# Patient Record
(demographics unavailable — no encounter records)

---

## 2024-10-17 NOTE — DATA REVIEWED
[MRI] : MRI [Bilateral] : of the bilateral [Knee] : knee [Report was reviewed and noted in the chart] : The report was reviewed and noted in the chart [I independently reviewed and interpreted images and report] : I independently reviewed and interpreted images and report [I reviewed the films/CD] : I reviewed the films/CD [FreeTextEntry1] : [facility] 10/11/24:  LEFT: 1. Mild ACL sprain with surrounding synovitis without discontinuity 2. Slight patellofemoral effusion, synovitis, medial synovial plica, and small popliteal cyst 3. No acute osseous injury, meniscal tear, full-thickness chondral defect, or loose body 4. No significant interval change from prior exam  RIGHT: 1. Mild partial tearing and delamination of the medial quadriceps tendon insertion with mild surrounding bursitis without discontinuity. Clinical correlation is recommended 2. Slight patellofemoral effusion, synovitis, and slight popliteal cyst 3. Mild ACL sprain most likely chronic 4. No acute osseous injury, meniscal tear, full-thickness chondral deftc, or loose body

## 2024-10-17 NOTE — IMAGING
[de-identified] : BILATERAL KNEE Inspection: mild effusion, left knee with abrasion to anterior aspect, no sign of infection Palpation: medial joint line tenderness  Knee Range of Motion: 0-130  Strength: 5/5 Quadriceps strength, 5/5 Hamstring strength Neurological: light touch is intact throughout Ligament Stability and Special Tests:  McMurrays: Positive Lachman: neg Pivot Shift: neg Posterior Drawer: neg Valgus: neg Varus: neg Patella Apprehension: neg Patella Maltracking: neg

## 2024-10-17 NOTE — HISTORY OF PRESENT ILLNESS
[de-identified] : 10/17/2024: Patient present today for follow-up of B/L knees. Underwent MRI and is here to discuss the imaging results.   10/4/24: Here for new injury to the left knee about 5 weeks ago and had fallen. Had a laceration to the knee which has been healing and then increased pain to the left posterior area of the knee. Right knee has been an ongoing issue for years. PF pain when seated for a while and when getting up

## 2024-10-17 NOTE — DISCUSSION/SUMMARY
[de-identified] : Assessment: The patient is a 47 year old male with physical exam findings consistent with PFS, PARTIAL QUAD TEAR, QUAD TENDINITIS, ACL SPRAIN.   Patient and I discussed their symptoms. Discussed findings of today's exam and possible causes of patient's pain. Educated patient on their most probable diagnosis. Reviewed possible courses of treatment, and we collaboratively decided best course of treatment at this time will include:   1. Reviewed and discussed MRI results with patient. Discussed probable dx. Discussed nonoperative vs operative treatment options.  2. Discussed possible PRP if necessary 3. Initiate PT, script provided     Follow up in 6-8 weeks  Instructions: Dx / Natural History The patient was advised of the diagnosis.  The natural history of the pathology was explained in full to the patient in layman's terms.  Several different treatment options were discussed and explained in full to the patient including the risks and benefits of both surgical and non-surgical treatments.  All questions and concerns were answered.   RICE I explained to the patient that rest, ice, compression, and elevation would benefit them.  They may return to activity after follow-up or when they no longer have any pain.   NSAIDs - OTC Patient is to begin over the counter oral anti-inflammatory medications on an as needed basis, as long as there are no medical contraindications.  Patient is counseled on possible GI and blood pressure side effects.   Pain Guide Activities The patient was advised to let pain guide the gradual advancement of activities.   Icing The patient was advised to apply ice (wrapped in a towel or protective covering) to the area daily (20 minutes at a time, 2-4X/day).   All of the patient's questions were answered to His satisfaction. Diagnoses and potential treatments were reviewed. He agreed with the plan and would like to move forward with it.

## 2024-12-05 NOTE — HISTORY OF PRESENT ILLNESS
[de-identified] : 12/05/2024: JUANA SHAH is a 47 year old male who presents for evaluation of Left shoulder. Patient states that over the summer, he noticed a sharp pain in shoulder, without specific injury. He states pain has been persistent since then. States pain is induced with certain stretching/movements. he has not done PT for shoulder. Of note, pt had hx of right frozen shoulder years ago. Pt sees Dr. Randolph for bilateral knees, goes to PT for knees. DM Last A1C 6.5.

## 2024-12-05 NOTE — DATA REVIEWED
[FreeTextEntry1] :  Left X-Ray Examination of the SHOULDER (2 views):  no fractures, subluxations or dislocations.   X-Ray Examination of the SCAPULA 1 or 2 views shows: no significant abnormalities    14

## 2024-12-05 NOTE — IMAGING
[de-identified] :  LEFT SHOULDER  Inspection: No swelling.   Palpation : Tenderness is noted at the anterior shoulder and lateral shoulder.  Range of motion: There is pain with range of motion. There is decreased range of motion with pain. Active motion equals passive motion.   , ER 40  Strength: There is pain, weakness, and discomfort with strength testing.  Neurological testings: motor and sensor intact distally.  Ligament Stability and Special Testss:   There is positive arc of pain.   Shoulder apprehension: neg  Shoulder relocation: neg  Obriens test: pos  Biceps Active test: neg  Zamora Labral Shear: neg  Impingement testing: pos  Ravin testing: pos  Whipple: pos  Cross Body Adduction: neg

## 2024-12-05 NOTE — DISCUSSION/SUMMARY
[Medication Risks Reviewed] : Medication risks reviewed [de-identified] :  - We discussed their diagnosis and treatment options at length including the risks and benefits of both surgical and non-surgical options..   -The pertinent aspects of the natural history of adhesive capsulitis were reviewed with the patient, including the three broad stages of freezing, frozen, and thawing. I reviewed with the patient that this condition is often associated with diabetes mellitus and thyroid disorders. The time from disease onset until symptom resolution may be one to two years.  - We will continue conservative treatment with PT, icing, and anti-inflammatory medication.  - Physical Therapy will be prescribed to work on ROM along with a home exercise program.  - The risks, benefits, and alternatives to intra-articular corticosteroid injection were reviewed with the patient. The patient wished to proceed with this treatment course.  - If in 3 months no improvement, will consider a second injection.  - If at 9 months no improvement, will consider a capsular release.

## 2024-12-13 NOTE — IMAGING
[de-identified] : BILATERAL KNEE Inspection: mild effusion, left knee with abrasion to anterior aspect, no sign of infection Palpation: medial joint line tenderness  Knee Range of Motion: 0-130  Strength: 5/5 Quadriceps strength, 5/5 Hamstring strength Neurological: light touch is intact throughout Ligament Stability and Special Tests:  McMurrays: Positive Lachman: neg Pivot Shift: neg Posterior Drawer: neg Valgus: neg Varus: neg Patella Apprehension: neg Patella Maltracking: neg

## 2024-12-13 NOTE — DISCUSSION/SUMMARY
[de-identified] : Assessment: The patient is a 47 year old male with physical exam findings consistent with PFS, PARTIAL QUAD TEAR, QUAD TENDINITIS, ACL SPRAIN.   Patient and I discussed their symptoms. Discussed findings of today's exam and possible causes of patient's pain. Educated patient on their most probable diagnosis. Reviewed possible courses of treatment, and we collaboratively decided best course of treatment at this time will include:   1. Initiate PT, script provided 2. HEP   Follow up PRN  Instructions: Dx / Natural History The patient was advised of the diagnosis.  The natural history of the pathology was explained in full to the patient in layman's terms.  Several different treatment options were discussed and explained in full to the patient including the risks and benefits of both surgical and non-surgical treatments.  All questions and concerns were answered.   RICE I explained to the patient that rest, ice, compression, and elevation would benefit them.  They may return to activity after follow-up or when they no longer have any pain.   NSAIDs - OTC Patient is to begin over the counter oral anti-inflammatory medications on an as needed basis, as long as there are no medical contraindications.  Patient is counseled on possible GI and blood pressure side effects.   Pain Guide Activities The patient was advised to let pain guide the gradual advancement of activities.   Icing The patient was advised to apply ice (wrapped in a towel or protective covering) to the area daily (20 minutes at a time, 2-4X/day).   All of the patient's questions were answered to His satisfaction. Diagnoses and potential treatments were reviewed. He agreed with the plan and would like to move forward with it.

## 2024-12-13 NOTE — HISTORY OF PRESENT ILLNESS
[de-identified] : 12/13/24: Bilateral knee follow up. Left knee is doing well. Still some aching in the right knee, still some discomfort with stairs but no affect to ADL  10/17/2024: Patient present today for follow-up of B/L knees. Underwent MRI and is here to discuss the imaging results.   10/4/24: Here for new injury to the left knee about 5 weeks ago and had fallen. Had a laceration to the knee which has been healing and then increased pain to the left posterior area of the knee. Right knee has been an ongoing issue for years. PF pain when seated for a while and when getting up

## 2025-02-27 NOTE — DATA REVIEWED
[FreeTextEntry1] :  Left X-Ray Examination of the SHOULDER (2 views):  no fractures, subluxations or dislocations.   X-Ray Examination of the SCAPULA 1 or 2 views shows: no significant abnormalities

## 2025-02-27 NOTE — IMAGING
[de-identified] :  LEFT SHOULDER  Inspection: No swelling.   Palpation : Tenderness is noted at the anterior shoulder and lateral shoulder.  Range of motion: There is pain with range of motion. There is decreased range of motion with pain. Active motion equals passive motion.   , ER 70 IR L hip Strength: There is pain, weakness, and discomfort with strength testing.  Neurological testing: motor and sensor intact distally.  Ligament Stability and Special Tests:   There is positive arc of pain.   Shoulder apprehension: neg  Shoulder relocation: neg  Obriens test: pos  Biceps Active test: neg  Zamora Labral Shear: neg  Impingement testing: pos  Ravin testing: pos  Whipple: pos  Cross Body Adduction: neg

## 2025-02-27 NOTE — HISTORY OF PRESENT ILLNESS
[de-identified] : 12/05/2024: JUANA SHAH is a 47 year old male who presents for evaluation of Left shoulder. Patient states that over the summer, he noticed a sharp pain in shoulder, without specific injury. He states pain has been persistent since then. States pain is induced with certain stretching/movements. he has not done PT for shoulder. Of note, pt had hx of right frozen shoulder years ago. Pt sees Dr. Randolph for bilateral knees, goes to PT for knees. DM Last A1C 6.5.   02/27/2025 JUANA  is here today to follow up on left shoulder. Patient had been doing PT and taking meloxicam. Patient reports some slightly improvement since last visit.

## 2025-02-27 NOTE — DISCUSSION/SUMMARY
[Medication Risks Reviewed] : Medication risks reviewed [de-identified] :  - We discussed their diagnosis and treatment options at length including the risks and benefits of both surgical and non-surgical options..   -The pertinent aspects of the natural history of adhesive capsulitis were reviewed with the patient, including the three broad stages of freezing, frozen, and thawing. I reviewed with the patient that this condition is often associated with diabetes mellitus and thyroid disorders. The time from disease onset until symptom resolution may be one to two years.  - We will continue conservative treatment with PT, icing, and anti-inflammatory medication.  - Physical Therapy will be prescribed to work on ROM along with a home exercise program.  - The risks, benefits, and alternatives to intra-articular corticosteroid injection were reviewed with the patient. The patient wished to proceed with this treatment course.  - If in 3 months no improvement, will consider a second injection.  - If at 9 months no improvement, will consider a capsular release. *** 2.27 L shoulder adhesive capsulitis questionable biceps tendinitis improving Complaint with PT, PT rx given Discussed with pt that due to his age we can order an MR to assess intra-articular shoulder pathology not interested at this time next visit assess shoulder ER and IR (75, L hip today) consider MR

## 2025-06-03 NOTE — HISTORY OF PRESENT ILLNESS
[de-identified] : 12/05/2024: JUANA SHAH is a 47 year old male who presents for evaluation of Left shoulder. Patient states that over the summer, he noticed a sharp pain in shoulder, without specific injury. He states pain has been persistent since then. States pain is induced with certain stretching/movements. he has not done PT for shoulder. Of note, pt had hx of right frozen shoulder years ago. Pt sees Dr. Randolph for bilateral knees, goes to PT for knees. DM Last A1C 6.5.   02/27/2025 JUANA  is here today to follow up on left shoulder. Patient had been doing PT and taking meloxicam. Patient reports some slightly improvement since last visit.   06/03/2025 JUANA  is here today to follow up evaluation of left shoulder pain. Patient had been doing PT once a week, reports he has intermittent pain. He continues with activity as tolerated. Patient had been taking Mobic sparingly.

## 2025-06-03 NOTE — DISCUSSION/SUMMARY
[Medication Risks Reviewed] : Medication risks reviewed [de-identified] :  - We discussed their diagnosis and treatment options at length including the risks and benefits of both surgical and non-surgical options..   -The pertinent aspects of the natural history of adhesive capsulitis were reviewed with the patient, including the three broad stages of freezing, frozen, and thawing. I reviewed with the patient that this condition is often associated with diabetes mellitus and thyroid disorders. The time from disease onset until symptom resolution may be one to two years.  - We will continue conservative treatment with PT, icing, and anti-inflammatory medication.  - Physical Therapy will be prescribed to work on ROM along with a home exercise program.  - The risks, benefits, and alternatives to intra-articular corticosteroid injection were reviewed with the patient. The patient wished to proceed with this treatment course.  - If in 3 months no improvement, will consider a second injection.  - If at 9 months no improvement, will consider a capsular release. *** 2.27 L shoulder adhesive capsulitis questionable biceps tendinitis improving Complaint with PT, PT rx given Discussed with pt that due to his age we can order an MR to assess intra-articular shoulder pathology not interested at this time next visit assess shoulder ER and IR (75, L hip today) consider MR  6/3/25  here for follow up  L shoulder adhesive capsulitis questionable biceps tendinitis improving Would like to continue with Pt As discussed previously, will proceed with MRI of left shoulder given persistent symptoms.  FU after MRI

## 2025-06-24 NOTE — DISCUSSION/SUMMARY
[Medication Risks Reviewed] : Medication risks reviewed [de-identified] :  - We discussed their diagnosis and treatment options at length including the risks and benefits of both surgical and non-surgical options..   -The pertinent aspects of the natural history of adhesive capsulitis were reviewed with the patient, including the three broad stages of freezing, frozen, and thawing. I reviewed with the patient that this condition is often associated with diabetes mellitus and thyroid disorders. The time from disease onset until symptom resolution may be one to two years.  - We will continue conservative treatment with PT, icing, and anti-inflammatory medication.  - Physical Therapy will be prescribed to work on ROM along with a home exercise program.  - The risks, benefits, and alternatives to intra-articular corticosteroid injection were reviewed with the patient. The patient wished to proceed with this treatment course.  - If in 3 months no improvement, will consider a second injection.  - If at 9 months no improvement, will consider a capsular release. *** 2.27 L shoulder adhesive capsulitis questionable biceps tendinitis improving Complaint with PT, PT rx given Discussed with pt that due to his age we can order an MR to assess intra-articular shoulder pathology not interested at this time next visit assess shoulder ER and IR (75, L hip today) consider MR  6/3/25  here for follow up  L shoulder adhesive capsulitis questionable biceps tendinitis improving Would like to continue with Pt As discussed previously, will proceed with MRI of left shoulder given persistent symptoms.  FU after MRI *** 6.24 here for follow up  L shoulder adhesive capsulitis w/ biceps tendinitis improving Would like to continue with Pt  next visit: consider csi if not better

## 2025-06-24 NOTE — IMAGING
[de-identified] :  LEFT SHOULDER  Inspection: No swelling.   Palpation : Tenderness is noted at the anterior shoulder and lateral shoulder.  Range of motion: There is pain with range of motion. There is decreased range of motion with pain. Active motion equals passive motion.   , ER 70 IR L hip Strength: There is pain, weakness, and discomfort with strength testing.  Neurological testing: motor and sensor intact distally.  Ligament Stability and Special Tests:   There is positive arc of pain.   Shoulder apprehension: neg  Shoulder relocation: neg  Obriens test: pos  Biceps Active test: neg  Zamora Labral Shear: neg  Impingement testing: pos  Ravin testing: pos  Whipple: pos  Cross Body Adduction: neg